# Patient Record
Sex: MALE | Race: WHITE
[De-identification: names, ages, dates, MRNs, and addresses within clinical notes are randomized per-mention and may not be internally consistent; named-entity substitution may affect disease eponyms.]

---

## 2019-09-10 ENCOUNTER — HOSPITAL ENCOUNTER (OUTPATIENT)
Dept: HOSPITAL 61 - PCVCIMAG | Age: 59
Discharge: HOME | End: 2019-09-10
Payer: COMMERCIAL

## 2019-09-10 DIAGNOSIS — F17.200: ICD-10-CM

## 2019-09-10 DIAGNOSIS — I70.203: Primary | ICD-10-CM

## 2019-09-10 PROCEDURE — 93926 LOWER EXTREMITY STUDY: CPT

## 2019-09-10 NOTE — PCVCIMAG
EXAM: RIGHT LOWER EXTREMITY ARTERIAL DUPLEX



INDICATION: Peripheral Arterial Disease. Leg pain.



FINDINGS: 

Right Leg: Common femoral and profunda femoral arteries are patent.

Segmental occlusion distal native superficial femoral artery.

Refilling of the popliteal artery. There is occlusion throughout the

anterior and posterior tibial arteries. Peroneal artery is patent.



IMPRESSION: 

Segmental occlusion distal native right superficial femoral artery.

Occlusion of the right anterior and posterior tibial arteries.





LOC:[id number]

## 2019-09-25 ENCOUNTER — HOSPITAL ENCOUNTER (OUTPATIENT)
Dept: HOSPITAL 35 - CATH | Age: 59
Discharge: HOME | End: 2019-09-25
Attending: RADIOLOGY
Payer: COMMERCIAL

## 2019-09-25 VITALS — DIASTOLIC BLOOD PRESSURE: 76 MMHG | SYSTOLIC BLOOD PRESSURE: 118 MMHG

## 2019-09-25 VITALS — BODY MASS INDEX: 24.52 KG/M2 | HEIGHT: 73 IN | WEIGHT: 185 LBS

## 2019-09-25 DIAGNOSIS — E78.5: ICD-10-CM

## 2019-09-25 DIAGNOSIS — I70.211: Primary | ICD-10-CM

## 2019-09-25 DIAGNOSIS — F17.210: ICD-10-CM

## 2019-09-25 DIAGNOSIS — I10: ICD-10-CM

## 2019-09-25 DIAGNOSIS — Z79.82: ICD-10-CM

## 2019-09-25 DIAGNOSIS — Z79.899: ICD-10-CM

## 2019-09-25 DIAGNOSIS — E78.00: ICD-10-CM

## 2019-12-26 ENCOUNTER — HOSPITAL ENCOUNTER (OUTPATIENT)
Dept: HOSPITAL 61 - PCVCIMAG | Age: 59
Discharge: HOME | End: 2019-12-26
Attending: RADIOLOGY
Payer: COMMERCIAL

## 2019-12-26 DIAGNOSIS — I73.9: ICD-10-CM

## 2019-12-26 DIAGNOSIS — I65.23: Primary | ICD-10-CM

## 2019-12-26 PROCEDURE — 93880 EXTRACRANIAL BILAT STUDY: CPT

## 2019-12-26 PROCEDURE — 93926 LOWER EXTREMITY STUDY: CPT

## 2019-12-26 NOTE — PCVCIMAG
EXAM: BILATERAL CAROTID DUPLEX



INDICATION: Carotid Occlusive Disease.



FINDINGS: 

Doppler Measurements (centimeters per second):



RIGHT:  Peak CCA-62, Peak ECA-64, Diastolic ICA-26, Peak ICA-53,

ICA/CCA Ratio-0.9.



LEFT:  Peak CCA-57, Peak ECA-42, Diastolic ICA-31, Peak ICA-69,

ICA/CCA Ratio-1.2.



RIGHT CAROTID: The carotid bulb has minimal plaque. The proximal

internal carotid artery shows no significant stenosis. The common

carotid artery shows no significant stenosis. The external carotid

artery shows no significant stenosis.



LEFT CAROTID:  The carotid bulb has minimal plaque. The proximal

internal carotid artery shows no significant stenosis. The common

carotid artery shows no significant stenosis. The external carotid

artery shows no significant stenosis.



Antegrade flow in both vertebral arteries.



IMPRESSION: 

No significant stenosis of the right internal carotid artery with

minimal plaque.

No significant stenosis of the left internal carotid artery with

minimal plaque.



LOC:[id number]

## 2019-12-26 NOTE — PCVCIMAG
EXAM: RIGHT LOWER EXTREMITY ARTERIAL DUPLEX



INDICATION: Peripheral Arterial Disease. Leg pain.



FINDINGS: 

Right Leg: Common femoral and profunda femoral arteries are patent.

Superficial femoral artery and popliteal artery are patent. Previous

stent distal superficial femoral artery maintaining satisfactory

patency. Unchanged occlusion of the anterior and posterior tibial

arteries. Peroneal artery is patent.



IMPRESSION: 

Previous stent distal right superficial femoral artery is patent.

Unchanged occlusion right anterior and posterior tibial arteries.





LOC:[id number]

## 2020-01-29 ENCOUNTER — HOSPITAL ENCOUNTER (OUTPATIENT)
Dept: HOSPITAL 35 - SJCVCIMAG | Age: 60
End: 2020-01-29
Attending: INTERNAL MEDICINE
Payer: COMMERCIAL

## 2020-01-29 DIAGNOSIS — R07.89: ICD-10-CM

## 2020-01-29 DIAGNOSIS — I10: ICD-10-CM

## 2020-01-29 DIAGNOSIS — I73.9: ICD-10-CM

## 2020-01-29 DIAGNOSIS — E78.00: ICD-10-CM

## 2020-01-29 DIAGNOSIS — Z72.0: ICD-10-CM

## 2020-01-29 DIAGNOSIS — R06.02: Primary | ICD-10-CM

## 2020-03-16 ENCOUNTER — HOSPITAL ENCOUNTER (OUTPATIENT)
Dept: HOSPITAL 35 - SJCVCIMAG | Age: 60
End: 2020-03-16
Attending: FAMILY MEDICINE
Payer: COMMERCIAL

## 2020-03-16 DIAGNOSIS — Z79.899: ICD-10-CM

## 2020-03-16 DIAGNOSIS — E78.5: ICD-10-CM

## 2020-03-16 DIAGNOSIS — I10: ICD-10-CM
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
## 2020-03-16 DIAGNOSIS — I70.203: ICD-10-CM

## 2020-03-16 DIAGNOSIS — T82.898A: Primary | ICD-10-CM

## 2020-03-16 DIAGNOSIS — Z79.82: ICD-10-CM

## 2020-03-16 DIAGNOSIS — F17.200: ICD-10-CM
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
## 2020-03-16 DIAGNOSIS — E78.00: ICD-10-CM

## 2020-03-24 ENCOUNTER — HOSPITAL ENCOUNTER (OUTPATIENT)
Dept: HOSPITAL 35 - CATH | Age: 60
Discharge: HOME | End: 2020-03-24
Attending: RADIOLOGY
Payer: COMMERCIAL

## 2020-03-24 VITALS — SYSTOLIC BLOOD PRESSURE: 136 MMHG | DIASTOLIC BLOOD PRESSURE: 85 MMHG

## 2020-03-24 VITALS — BODY MASS INDEX: 25.73 KG/M2 | WEIGHT: 190 LBS | HEIGHT: 72 IN

## 2020-03-24 DIAGNOSIS — I70.1: ICD-10-CM

## 2020-03-24 DIAGNOSIS — Z71.6: ICD-10-CM

## 2020-03-24 DIAGNOSIS — I25.10: ICD-10-CM

## 2020-03-24 DIAGNOSIS — I10: ICD-10-CM

## 2020-03-24 DIAGNOSIS — Z98.890: ICD-10-CM

## 2020-03-24 DIAGNOSIS — E78.00: ICD-10-CM

## 2020-03-24 DIAGNOSIS — Z79.899: ICD-10-CM

## 2020-03-24 DIAGNOSIS — F17.210: ICD-10-CM

## 2020-03-24 DIAGNOSIS — I70.211: Primary | ICD-10-CM

## 2020-03-24 DIAGNOSIS — E78.5: ICD-10-CM

## 2020-03-24 DIAGNOSIS — Z98.52: ICD-10-CM

## 2020-03-24 LAB
ANION GAP SERPL CALC-SCNC: 7 MMOL/L (ref 7–16)
BUN SERPL-MCNC: 9 MG/DL (ref 7–18)
CALCIUM SERPL-MCNC: 9.4 MG/DL (ref 8.5–10.1)
CHLORIDE SERPL-SCNC: 101 MMOL/L (ref 98–107)
CO2 SERPL-SCNC: 29 MMOL/L (ref 21–32)
CREAT SERPL-MCNC: 1.1 MG/DL (ref 0.7–1.3)
ERYTHROCYTE [DISTWIDTH] IN BLOOD BY AUTOMATED COUNT: 15.3 % (ref 10.5–14.5)
GLUCOSE SERPL-MCNC: 101 MG/DL (ref 74–106)
HCT VFR BLD CALC: 47.3 % (ref 42–52)
HGB BLD-MCNC: 15.8 GM/DL (ref 14–18)
MCH RBC QN AUTO: 30.2 PG (ref 26–34)
MCHC RBC AUTO-ENTMCNC: 33.5 G/DL (ref 28–37)
MCV RBC: 90.2 FL (ref 80–100)
PLATELET # BLD: 166 THOU/UL (ref 150–400)
POTASSIUM SERPL-SCNC: 3.6 MMOL/L (ref 3.5–5.1)
RBC # BLD AUTO: 5.24 MIL/UL (ref 4.5–6)
SODIUM SERPL-SCNC: 137 MMOL/L (ref 136–145)
WBC # BLD AUTO: 6.5 THOU/UL (ref 4–11)

## 2020-07-01 ENCOUNTER — HOSPITAL ENCOUNTER (OUTPATIENT)
Dept: HOSPITAL 35 - SJCVCIMAG | Age: 60
End: 2020-07-01
Attending: RADIOLOGY
Payer: COMMERCIAL

## 2020-07-01 DIAGNOSIS — I70.203: Primary | ICD-10-CM

## 2020-07-13 ENCOUNTER — HOSPITAL ENCOUNTER (OUTPATIENT)
Dept: HOSPITAL 35 - CATH | Age: 60
Discharge: HOME | End: 2020-07-13
Attending: RADIOLOGY
Payer: COMMERCIAL

## 2020-07-13 VITALS — SYSTOLIC BLOOD PRESSURE: 116 MMHG | DIASTOLIC BLOOD PRESSURE: 76 MMHG

## 2020-07-13 VITALS — HEIGHT: 72 IN | WEIGHT: 177 LBS | BODY MASS INDEX: 23.98 KG/M2

## 2020-07-13 DIAGNOSIS — E78.00: ICD-10-CM

## 2020-07-13 DIAGNOSIS — F17.210: ICD-10-CM

## 2020-07-13 DIAGNOSIS — Z79.82: ICD-10-CM

## 2020-07-13 DIAGNOSIS — Z79.899: ICD-10-CM

## 2020-07-13 DIAGNOSIS — I70.1: ICD-10-CM

## 2020-07-13 DIAGNOSIS — I70.211: Primary | ICD-10-CM

## 2020-07-13 DIAGNOSIS — I10: ICD-10-CM

## 2020-07-13 DIAGNOSIS — Z98.52: ICD-10-CM

## 2020-07-13 DIAGNOSIS — E78.5: ICD-10-CM

## 2020-07-13 DIAGNOSIS — Z98.890: ICD-10-CM

## 2020-07-13 LAB
ANION GAP SERPL CALC-SCNC: 8 MMOL/L (ref 7–16)
BUN SERPL-MCNC: 6 MG/DL (ref 7–18)
CALCIUM SERPL-MCNC: 9.3 MG/DL (ref 8.5–10.1)
CHLORIDE SERPL-SCNC: 97 MMOL/L (ref 98–107)
CO2 SERPL-SCNC: 30 MMOL/L (ref 21–32)
CREAT SERPL-MCNC: 1.1 MG/DL (ref 0.7–1.3)
ERYTHROCYTE [DISTWIDTH] IN BLOOD BY AUTOMATED COUNT: 15.5 % (ref 10.5–14.5)
GLUCOSE SERPL-MCNC: 113 MG/DL (ref 74–106)
HCT VFR BLD CALC: 49.8 % (ref 42–52)
HGB BLD-MCNC: 16.5 GM/DL (ref 14–18)
MCH RBC QN AUTO: 30.4 PG (ref 26–34)
MCHC RBC AUTO-ENTMCNC: 33 G/DL (ref 28–37)
MCV RBC: 91.9 FL (ref 80–100)
PLATELET # BLD: 194 THOU/UL (ref 150–400)
POTASSIUM SERPL-SCNC: 3.1 MMOL/L (ref 3.5–5.1)
RBC # BLD AUTO: 5.42 MIL/UL (ref 4.5–6)
SODIUM SERPL-SCNC: 135 MMOL/L (ref 136–145)
WBC # BLD AUTO: 8.1 THOU/UL (ref 4–11)

## 2021-03-01 ENCOUNTER — HOSPITAL ENCOUNTER (OUTPATIENT)
Dept: HOSPITAL 35 - SJCVCIMAG | Age: 61
End: 2021-03-01
Attending: RADIOLOGY
Payer: COMMERCIAL

## 2021-03-01 DIAGNOSIS — I73.9: ICD-10-CM

## 2021-03-01 DIAGNOSIS — E78.00: ICD-10-CM

## 2021-03-01 DIAGNOSIS — Z01.810: Primary | ICD-10-CM

## 2021-03-01 DIAGNOSIS — I10: ICD-10-CM

## 2021-03-01 DIAGNOSIS — Z87.891: ICD-10-CM

## 2021-03-01 DIAGNOSIS — Z79.899: ICD-10-CM

## 2021-03-01 DIAGNOSIS — Z79.82: ICD-10-CM

## 2021-03-04 ENCOUNTER — HOSPITAL ENCOUNTER (OUTPATIENT)
Dept: HOSPITAL 35 - SJCVCIMAG | Age: 61
End: 2021-03-04
Attending: INTERNAL MEDICINE
Payer: COMMERCIAL

## 2021-03-04 DIAGNOSIS — I73.9: ICD-10-CM

## 2021-03-04 DIAGNOSIS — I51.7: Primary | ICD-10-CM

## 2021-03-09 ENCOUNTER — HOSPITAL ENCOUNTER (OUTPATIENT)
Dept: HOSPITAL 35 - LAB | Age: 61
End: 2021-03-09
Attending: THORACIC SURGERY (CARDIOTHORACIC VASCULAR SURGERY)
Payer: COMMERCIAL

## 2021-03-09 DIAGNOSIS — Z01.812: Primary | ICD-10-CM

## 2021-03-09 DIAGNOSIS — Z20.822: ICD-10-CM

## 2021-03-09 LAB
ALBUMIN SERPL-MCNC: 3.9 G/DL (ref 3.4–5)
ALT SERPL-CCNC: 29 U/L (ref 16–63)
ANION GAP SERPL CALC-SCNC: 8 MMOL/L (ref 7–16)
APTT BLD: 24.9 SECONDS (ref 24.5–32.8)
AST SERPL-CCNC: 18 U/L (ref 15–37)
BASOPHILS NFR BLD AUTO: 1.6 % (ref 0–2)
BILIRUB SERPL-MCNC: 0.3 MG/DL (ref 0.2–1)
BILIRUB UR-MCNC: NEGATIVE MG/DL
BUN SERPL-MCNC: 12 MG/DL (ref 7–18)
CALCIUM SERPL-MCNC: 9 MG/DL (ref 8.5–10.1)
CHLORIDE SERPL-SCNC: 100 MMOL/L (ref 98–107)
CO2 SERPL-SCNC: 32 MMOL/L (ref 21–32)
COLOR UR: YELLOW
CREAT SERPL-MCNC: 1.3 MG/DL (ref 0.7–1.3)
EOSINOPHIL NFR BLD: 3.9 % (ref 0–3)
ERYTHROCYTE [DISTWIDTH] IN BLOOD BY AUTOMATED COUNT: 16.4 % (ref 10.5–14.5)
GLUCOSE SERPL-MCNC: 114 MG/DL (ref 74–106)
GRANULOCYTES NFR BLD MANUAL: 59.4 % (ref 36–66)
HCT VFR BLD CALC: 52.6 % (ref 42–52)
HGB BLD-MCNC: 17.4 GM/DL (ref 14–18)
INR PPP: 1
KETONES UR STRIP-MCNC: NEGATIVE MG/DL
LYMPHOCYTES NFR BLD AUTO: 25.5 % (ref 24–44)
MCH RBC QN AUTO: 30.9 PG (ref 26–34)
MCHC RBC AUTO-ENTMCNC: 33 G/DL (ref 28–37)
MCV RBC: 93.7 FL (ref 80–100)
MONOCYTES NFR BLD: 9.6 % (ref 1–8)
NEUTROPHILS # BLD: 3.6 THOU/UL (ref 1.4–8.2)
PLATELET # BLD: 168 THOU/UL (ref 150–400)
POTASSIUM SERPL-SCNC: 3.6 MMOL/L (ref 3.5–5.1)
PROT SERPL-MCNC: 8.2 G/DL (ref 6.4–8.2)
PROTHROMBIN TIME: 11 SECONDS (ref 9.3–11.4)
RBC # BLD AUTO: 5.62 MIL/UL (ref 4.5–6)
RBC # UR STRIP: NEGATIVE /UL
RBC MORPH BLD: NORMAL
SODIUM SERPL-SCNC: 140 MMOL/L (ref 136–145)
SP GR UR STRIP: <= 1.005 (ref 1–1.03)
URINE CLARITY: CLEAR
URINE GLUCOSE-RANDOM*: NEGATIVE
URINE LEUKOCYTES-REFLEX: NEGATIVE
URINE NITRITE-REFLEX: NEGATIVE
URINE PROTEIN (DIPSTICK): NEGATIVE
UROBILINOGEN UR STRIP-ACNC: 0.2 E.U./DL (ref 0.2–1)
WBC # BLD AUTO: 6 THOU/UL (ref 4–11)

## 2021-03-15 ENCOUNTER — HOSPITAL ENCOUNTER (INPATIENT)
Dept: HOSPITAL 35 - 2N | Age: 61
LOS: 4 days | Discharge: HOME | DRG: 253 | End: 2021-03-19
Attending: THORACIC SURGERY (CARDIOTHORACIC VASCULAR SURGERY) | Admitting: THORACIC SURGERY (CARDIOTHORACIC VASCULAR SURGERY)
Payer: COMMERCIAL

## 2021-03-15 VITALS — SYSTOLIC BLOOD PRESSURE: 94 MMHG | DIASTOLIC BLOOD PRESSURE: 54 MMHG

## 2021-03-15 VITALS — SYSTOLIC BLOOD PRESSURE: 128 MMHG | DIASTOLIC BLOOD PRESSURE: 78 MMHG

## 2021-03-15 VITALS — DIASTOLIC BLOOD PRESSURE: 59 MMHG | SYSTOLIC BLOOD PRESSURE: 116 MMHG

## 2021-03-15 VITALS — DIASTOLIC BLOOD PRESSURE: 56 MMHG | SYSTOLIC BLOOD PRESSURE: 98 MMHG

## 2021-03-15 VITALS — BODY MASS INDEX: 23.96 KG/M2 | HEIGHT: 72.01 IN | WEIGHT: 176.9 LBS

## 2021-03-15 VITALS — SYSTOLIC BLOOD PRESSURE: 123 MMHG | DIASTOLIC BLOOD PRESSURE: 84 MMHG

## 2021-03-15 VITALS — DIASTOLIC BLOOD PRESSURE: 76 MMHG | SYSTOLIC BLOOD PRESSURE: 114 MMHG

## 2021-03-15 VITALS — DIASTOLIC BLOOD PRESSURE: 67 MMHG | SYSTOLIC BLOOD PRESSURE: 119 MMHG

## 2021-03-15 VITALS — DIASTOLIC BLOOD PRESSURE: 77 MMHG | SYSTOLIC BLOOD PRESSURE: 110 MMHG

## 2021-03-15 VITALS — DIASTOLIC BLOOD PRESSURE: 73 MMHG | SYSTOLIC BLOOD PRESSURE: 122 MMHG

## 2021-03-15 VITALS — DIASTOLIC BLOOD PRESSURE: 85 MMHG | SYSTOLIC BLOOD PRESSURE: 118 MMHG

## 2021-03-15 VITALS — DIASTOLIC BLOOD PRESSURE: 69 MMHG | SYSTOLIC BLOOD PRESSURE: 109 MMHG

## 2021-03-15 VITALS — SYSTOLIC BLOOD PRESSURE: 110 MMHG | DIASTOLIC BLOOD PRESSURE: 60 MMHG

## 2021-03-15 VITALS — DIASTOLIC BLOOD PRESSURE: 80 MMHG | SYSTOLIC BLOOD PRESSURE: 119 MMHG

## 2021-03-15 DIAGNOSIS — I10: ICD-10-CM

## 2021-03-15 DIAGNOSIS — D62: ICD-10-CM

## 2021-03-15 DIAGNOSIS — E78.5: ICD-10-CM

## 2021-03-15 DIAGNOSIS — Z98.52: ICD-10-CM

## 2021-03-15 DIAGNOSIS — N40.0: ICD-10-CM

## 2021-03-15 DIAGNOSIS — E87.6: ICD-10-CM

## 2021-03-15 DIAGNOSIS — Z71.6: ICD-10-CM

## 2021-03-15 DIAGNOSIS — I73.9: Primary | ICD-10-CM

## 2021-03-15 PROCEDURE — 50455: CPT

## 2021-03-15 PROCEDURE — 51481: CPT

## 2021-03-15 PROCEDURE — 56531: CPT

## 2021-03-15 PROCEDURE — 57092: CPT

## 2021-03-15 PROCEDURE — 65040: CPT

## 2021-03-15 PROCEDURE — 50010 RENAL EXPLORATION: CPT

## 2021-03-15 PROCEDURE — 57167: CPT

## 2021-03-15 PROCEDURE — 56526: CPT

## 2021-03-15 PROCEDURE — 50953 ENDOSCOPY OF URETER: CPT

## 2021-03-15 PROCEDURE — 51762: CPT

## 2021-03-15 PROCEDURE — 50386 REMOVE STENT VIA TRANSURETH: CPT

## 2021-03-15 PROCEDURE — 70005: CPT

## 2021-03-15 PROCEDURE — 56668: CPT

## 2021-03-15 PROCEDURE — 51412: CPT

## 2021-03-15 PROCEDURE — 10081 I&D PILONIDAL CYST COMP: CPT

## 2021-03-15 PROCEDURE — 57093: CPT

## 2021-03-15 PROCEDURE — 56682: CPT

## 2021-03-15 PROCEDURE — 65020: CPT

## 2021-03-15 PROCEDURE — 56524: CPT

## 2021-03-15 PROCEDURE — 50643: CPT

## 2021-03-15 PROCEDURE — 56760: CPT

## 2021-03-15 PROCEDURE — 56534: CPT

## 2021-03-15 PROCEDURE — 47375: CPT

## 2021-03-15 PROCEDURE — 62900: CPT

## 2021-03-15 PROCEDURE — 50101: CPT

## 2021-03-15 PROCEDURE — 10078: CPT

## 2021-03-15 PROCEDURE — 52287 CYSTOSCOPY CHEMODENERVATION: CPT

## 2021-03-15 PROCEDURE — 62110: CPT

## 2021-03-15 PROCEDURE — 50331: CPT

## 2021-03-15 PROCEDURE — 56528: CPT

## 2021-03-15 PROCEDURE — 56527: CPT

## 2021-03-15 NOTE — NUR
FROM RECOVERY TO ICU THIS AFTERNOON. ALERT AND ORIENTED AND VITALS STABLE.
STARTED ON CARDENE GTT FOR BP CONTROL. WOUND VAC, A-LINE AND ELIZABETH DRAINS AS
DOCUMENTED. PULSES DOPPLED AND ASSESSMENT AS DOCUMENTED.

## 2021-03-16 VITALS — DIASTOLIC BLOOD PRESSURE: 62 MMHG | SYSTOLIC BLOOD PRESSURE: 99 MMHG

## 2021-03-16 VITALS — SYSTOLIC BLOOD PRESSURE: 99 MMHG | DIASTOLIC BLOOD PRESSURE: 60 MMHG

## 2021-03-16 VITALS — SYSTOLIC BLOOD PRESSURE: 131 MMHG | DIASTOLIC BLOOD PRESSURE: 72 MMHG

## 2021-03-16 VITALS — DIASTOLIC BLOOD PRESSURE: 60 MMHG | SYSTOLIC BLOOD PRESSURE: 96 MMHG

## 2021-03-16 VITALS — SYSTOLIC BLOOD PRESSURE: 129 MMHG | DIASTOLIC BLOOD PRESSURE: 73 MMHG

## 2021-03-16 VITALS — SYSTOLIC BLOOD PRESSURE: 94 MMHG | DIASTOLIC BLOOD PRESSURE: 59 MMHG

## 2021-03-16 VITALS — DIASTOLIC BLOOD PRESSURE: 61 MMHG | SYSTOLIC BLOOD PRESSURE: 103 MMHG

## 2021-03-16 VITALS — SYSTOLIC BLOOD PRESSURE: 123 MMHG | DIASTOLIC BLOOD PRESSURE: 72 MMHG

## 2021-03-16 LAB
ANION GAP SERPL CALC-SCNC: 7 MMOL/L (ref 7–16)
BUN SERPL-MCNC: 10 MG/DL (ref 7–18)
CALCIUM SERPL-MCNC: 8 MG/DL (ref 8.5–10.1)
CHLORIDE SERPL-SCNC: 104 MMOL/L (ref 98–107)
CO2 SERPL-SCNC: 27 MMOL/L (ref 21–32)
CREAT SERPL-MCNC: 1 MG/DL (ref 0.7–1.3)
ERYTHROCYTE [DISTWIDTH] IN BLOOD BY AUTOMATED COUNT: 16 % (ref 10.5–14.5)
GLUCOSE SERPL-MCNC: 107 MG/DL (ref 74–106)
HCT VFR BLD CALC: 40.6 % (ref 42–52)
HGB BLD-MCNC: 13.4 GM/DL (ref 14–18)
MCH RBC QN AUTO: 31 PG (ref 26–34)
MCHC RBC AUTO-ENTMCNC: 33.1 G/DL (ref 28–37)
MCV RBC: 93.7 FL (ref 80–100)
PLATELET # BLD: 142 THOU/UL (ref 150–400)
POTASSIUM SERPL-SCNC: 3.4 MMOL/L (ref 3.5–5.1)
RBC # BLD AUTO: 4.34 MIL/UL (ref 4.5–6)
SODIUM SERPL-SCNC: 138 MMOL/L (ref 136–145)
WBC # BLD AUTO: 9.2 THOU/UL (ref 4–11)

## 2021-03-16 NOTE — NUR
pt transferred out of icu prior to cm able to visit with him. cm visited with
him via phone call, he hope to go home tomorrow. " albin my friend will be my
ride."/elver. works outside the home, independent, no dme needs. will cont
following as needed for dc needs.

## 2021-03-16 NOTE — NUR
PT RESTING COMFORTABLY. ART LINE DC'D HEMOSTASIS CONFIRMED.
DANIEL DC'D. POTASSIUM REPLACED PER [facility]
PROTOCAL. PT/OT TO SEE PT TODAY. PT TRANSFERED TO CCU AT APPROX 1030. PT
AFEBRILE, ADEQUATE UOP, NO BM, APPROPRIATE APPETITE. PT UPDATED AND EDUCATED
ON CONDITION AND POC. PT PROGRESSING TOWARDS POC.

## 2021-03-17 VITALS — SYSTOLIC BLOOD PRESSURE: 133 MMHG | DIASTOLIC BLOOD PRESSURE: 80 MMHG

## 2021-03-17 VITALS — SYSTOLIC BLOOD PRESSURE: 118 MMHG | DIASTOLIC BLOOD PRESSURE: 70 MMHG

## 2021-03-17 VITALS — SYSTOLIC BLOOD PRESSURE: 124 MMHG | DIASTOLIC BLOOD PRESSURE: 71 MMHG

## 2021-03-17 VITALS — SYSTOLIC BLOOD PRESSURE: 117 MMHG | DIASTOLIC BLOOD PRESSURE: 73 MMHG

## 2021-03-17 VITALS — SYSTOLIC BLOOD PRESSURE: 136 MMHG | DIASTOLIC BLOOD PRESSURE: 71 MMHG

## 2021-03-17 VITALS — DIASTOLIC BLOOD PRESSURE: 83 MMHG | SYSTOLIC BLOOD PRESSURE: 135 MMHG

## 2021-03-17 LAB
ANION GAP SERPL CALC-SCNC: 11 MMOL/L (ref 7–16)
BASOPHILS NFR BLD AUTO: 0.6 % (ref 0–2)
BUN SERPL-MCNC: 12 MG/DL (ref 7–18)
CALCIUM SERPL-MCNC: 8.5 MG/DL (ref 8.5–10.1)
CHLORIDE SERPL-SCNC: 106 MMOL/L (ref 98–107)
CO2 SERPL-SCNC: 24 MMOL/L (ref 21–32)
CREAT SERPL-MCNC: 0.9 MG/DL (ref 0.7–1.3)
EOSINOPHIL NFR BLD: 1.6 % (ref 0–3)
ERYTHROCYTE [DISTWIDTH] IN BLOOD BY AUTOMATED COUNT: 16.5 % (ref 10.5–14.5)
GLUCOSE SERPL-MCNC: 107 MG/DL (ref 74–106)
GRANULOCYTES NFR BLD MANUAL: 66.5 % (ref 36–66)
HCT VFR BLD CALC: 42.5 % (ref 42–52)
HGB BLD-MCNC: 13.9 GM/DL (ref 14–18)
LYMPHOCYTES NFR BLD AUTO: 21.5 % (ref 24–44)
MAGNESIUM SERPL-MCNC: 1.9 MG/DL (ref 1.8–2.4)
MCH RBC QN AUTO: 30.7 PG (ref 26–34)
MCHC RBC AUTO-ENTMCNC: 32.7 G/DL (ref 28–37)
MCV RBC: 93.9 FL (ref 80–100)
MONOCYTES NFR BLD: 9.8 % (ref 1–8)
NEUTROPHILS # BLD: 5.6 THOU/UL (ref 1.4–8.2)
PLATELET # BLD: 141 THOU/UL (ref 150–400)
POTASSIUM SERPL-SCNC: 3.7 MMOL/L (ref 3.5–5.1)
RBC # BLD AUTO: 4.53 MIL/UL (ref 4.5–6)
SODIUM SERPL-SCNC: 141 MMOL/L (ref 136–145)
WBC # BLD AUTO: 8.4 THOU/UL (ref 4–11)

## 2021-03-17 NOTE — NUR
PT ALERT AND ORIENTED, NO SIGNS OF DISTRESS AT THIS TIME, PT DENIES PAIN OR
DISCOMFORT. PT TRASNFERS INDEPENDENTLY AND DID WELL WITH PT TODAY. PT IS READY
TO GO HOME. ELIZABETH DRAINS INTACT AND SO IN WOUND VAC. NO CONCERNS AT THIS TIME, PT
RESTING IN CHAIR WATCHING TV WITH CALL LIGHT IN REACH.

## 2021-03-17 NOTE — NUR
assumed care at 1900, awake, alert and oriented, denies pain or sob, denies
any issues, sr on the monitor, assessments as charted, dressing to the right
leg intact, wound vac and bobby drains intact, vss, denies concerns, will
continue to monitor and follow poc

## 2021-03-17 NOTE — NUR
PT RESTING IN BED AT THIS TIME WITH NO C/O PAIN OR DISCOMFORT. ELIZABETH DRAINS
INTACT ALONG WITH WOUND VAC. PLAN IS FOR PT DISCHARGE HOME TOMORROW. NO
CONERNS AT THIS TIME. CALL LIGHT IN REACH AND REFRESHMENTS PROVIDED.

## 2021-03-18 VITALS — SYSTOLIC BLOOD PRESSURE: 126 MMHG | DIASTOLIC BLOOD PRESSURE: 80 MMHG

## 2021-03-18 VITALS — DIASTOLIC BLOOD PRESSURE: 88 MMHG | SYSTOLIC BLOOD PRESSURE: 139 MMHG

## 2021-03-18 VITALS — SYSTOLIC BLOOD PRESSURE: 98 MMHG | DIASTOLIC BLOOD PRESSURE: 66 MMHG

## 2021-03-18 VITALS — DIASTOLIC BLOOD PRESSURE: 70 MMHG | SYSTOLIC BLOOD PRESSURE: 113 MMHG

## 2021-03-18 VITALS — DIASTOLIC BLOOD PRESSURE: 75 MMHG | SYSTOLIC BLOOD PRESSURE: 123 MMHG

## 2021-03-18 NOTE — NUR
ASSUMED CARE FOR PATIENT AT 0700. PT ASSISTED TO CHAIR WITH RN. PT ASSESSMENT
PERFORMED AND CHARTED. PT DENIES PAIN. PT STATES HIS GOAL TODAY IS TO GO HOME
AND WOULD HAVE A RIDE WHEN READY. VSS. WILL CONTINUE TO MONITOR.

## 2021-03-18 NOTE — NUR
ASSUMED PT CARE AT CHANGE OF SHIFT, AWAKE, ALERT AND ORIENTED, SR ON THE
MONITOR, ELIZABETH DRAINS AND WOUND VAC INTACT, DENIES PAIN, UP TO THE BATHROOM STBY
ASSIST D/T WOUND ASSESSMENTS AS CHARTED, NO CONCERNS AT THIS TIME, WILL
CONTINUE TO MONITOR AND FOLLOW POC

## 2021-03-18 NOTE — NUR
Case discussed with the care team and CTS. Likely dc home tomorrow per CTS.
Drain 1 pulled today. Drain 2 to be dc'd tomorrow. Pt has a prevena vac and
will f/u outpt with CTS. Pt dc'd from therapy. No hh needs anticipated at this
time.

## 2021-03-19 VITALS — SYSTOLIC BLOOD PRESSURE: 114 MMHG | DIASTOLIC BLOOD PRESSURE: 70 MMHG

## 2021-03-19 VITALS — DIASTOLIC BLOOD PRESSURE: 70 MMHG | SYSTOLIC BLOOD PRESSURE: 114 MMHG

## 2021-03-19 VITALS — SYSTOLIC BLOOD PRESSURE: 113 MMHG | DIASTOLIC BLOOD PRESSURE: 71 MMHG

## 2021-03-19 NOTE — NUR
ASSUMED CARE AT 1900, DENIES PAIN OR SOB, ASSESSMENTS AS CHARTED, ELIZABETH DRAIN
INTACT, WOUND VAC INTACT, VSS, NO NEEDS AT THIS TIME, PASSED ON REPORT TO DAY
NURSE

## 2021-03-19 NOTE — NUR
ASSUMED PT CARE AT 0700. PT SITTING UP ON SIDE OF BED AWAITING BREAKFAST. VSS.
ASSESSMENT PERFORMED AND CHARTED. PTS GOAL IS TO GO HOME TODAY. WILL CONTINUE
TO MONITOR.

## 2021-03-20 NOTE — O
[facility]
[address] [place]   [address]                     OPERATIVE REPORT              
_______________________________________________________________________________
 
Name:       MANDIE STEVENSON              Room #:         213-P       [facility] [place]  
M.R.#:      [id number]                       Account #:      [id number]  
Admission:  03/15/21    Attend Phys:    Chan Mcintyre MD    
Discharge:  03/19/21    Date of Birth:  [birth year]  
                                                          Report #: [id number]
                                                                    [id number]   
_______________________________________________________________________________
THIS REPORT FOR:  
 
cc:  Thien Arredondo MD, Neal A. MD Forman,Chan ARMANDO MD                                            ~
 
DATE OF SERVICE:  03/15/2021
 
 
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
superficial femoral artery occlusion (chronic).
 
POSTOPERATIVE DIAGNOSIS:  Lower extremity arterial occlusive disease with right
superficial femoral artery occlusion (chronic).
 
OPERATION:  Right femoral to below the knee popliteal artery bypass with
reversed autogenous greater saphenous vein and intraoperative arteriogram.
 
SURGEON:  Chan Mcintyre MD
 
ASSISTANT:  AMY Luna.
 
ANESTHESIA:  General.
 
INDICATIONS:  The patient is a 60-year-old who works as an .  The
patient complains of claudication that has failed conservative treatment.  The
patient is a longtime smoker.  Cardiac stress test was satisfactory.
 
The patient has a chronically occluded right superficial femoral artery.  He has
been treated in the past with intravascular placement of covered stent and this
has occluded.  Popliteal artery is patent below the knee and it feeds a peroneal
artery.
 
FINDINGS AND TECHNIQUE:  After general anesthesia was established, saphenous
vein was harvested below the knee.  Next, incision was deepened to expose below
the knee popliteal artery.
 
A separate incision was made in the right groin to expose the common femoral
artery.  Greater saphenous vein was then exposed and harvested through
interrupted incisions.  When a satisfactory amount of vein had been exposed, the
patient was given heparin.  The vein was removed and the reversed vein was sewn
in end-to-side fashion to the common femoral artery.  The vein was brought
through subcutaneous tunnels and the distal end was sewn to below the knee
popliteal artery.
 
An intraoperative arteriogram was then taken that showed good patency of the
 
 
 
[facility]
[address]
[place]   [address]                     OPERATIVE REPORT              
_______________________________________________________________________________
 
Name:       MANDIE STEVENSON              Room #:         213-P       [facility] [place]  
M.R.#:      [id number]                       Account #:      [id number]  
Admission:  03/15/21    Attend Phys:    Chan Mcintyre MD    
Discharge:  03/19/21    Date of Birth:  [birth year]  
                                                          Report #: [id number]
                                                                    [id number]   
_______________________________________________________________________________
 
vein and a satisfactory distal anastomosis.
 
Flows were measured in the bypass graft and a Doppler was used to verify
patency.
 
When hemostasis was satisfactory, 15 Bryce drains were placed in the
subcutaneous tunnel from the vein harvest and the incisions were then closed in
layers.  The patient was taken to the recovery area in good condition and
tolerated the procedure well after a Prevena dressing was applied.  All counts
reported as correct.
 
 
 
 
 
 
 
 
 
 
 
 
 
 
 
 
 
 
 
 
 
 
 
 
 
 
 
 
 
 
 
 
 
  <ELECTRONICALLY SIGNED>
   By: Chan Mcintyre MD            
  03/20/21     1115
D: 03/15/21 1430                           _____________________________________
T: 03/15/21 1443                           Chan Mcintyre MD              /nt

## 2021-11-24 ENCOUNTER — HOSPITAL ENCOUNTER (OUTPATIENT)
Dept: HOSPITAL 35 - SJCVCIMAG | Age: 61
End: 2021-11-24
Attending: RADIOLOGY
Payer: COMMERCIAL

## 2021-11-24 DIAGNOSIS — I65.23: Primary | ICD-10-CM

## 2021-11-24 DIAGNOSIS — R06.02: ICD-10-CM

## 2021-11-24 DIAGNOSIS — Z79.82: ICD-10-CM

## 2021-11-24 DIAGNOSIS — I10: ICD-10-CM

## 2021-11-24 DIAGNOSIS — Z79.899: ICD-10-CM

## 2021-11-24 DIAGNOSIS — R09.89: ICD-10-CM

## 2021-11-24 DIAGNOSIS — E78.00: ICD-10-CM

## 2021-11-24 DIAGNOSIS — I70.201: ICD-10-CM
